# Patient Record
Sex: FEMALE | Race: BLACK OR AFRICAN AMERICAN | NOT HISPANIC OR LATINO | Employment: UNEMPLOYED | URBAN - NONMETROPOLITAN AREA
[De-identification: names, ages, dates, MRNs, and addresses within clinical notes are randomized per-mention and may not be internally consistent; named-entity substitution may affect disease eponyms.]

---

## 2020-05-12 ENCOUNTER — HISTORICAL (OUTPATIENT)
Dept: ADMINISTRATIVE | Facility: HOSPITAL | Age: 35
End: 2020-05-12

## 2020-05-12 LAB
ALBUMIN SERPL BCP-MCNC: 3.8 G/DL (ref 3.5–5)
ALBUMIN/GLOB SERPL ELPH: 1.1 {RATIO} (ref 1.5–2.2)
ALP SERPL-CCNC: 79 U/L (ref 45–117)
ALT SERPL W P-5'-P-CCNC: 21 U/L (ref 13–56)
ANION GAP SERPL CALC-SCNC: 7.8 MEQ/L (ref 10–20)
APPEARANCE, UA: CLEAR
AST SERPL-CCNC: 9 U/L (ref 15–37)
BACTERIA SPEC CULT: NEGATIVE /HPF
BASOPHILS NFR BLD: 0 10 (ref 0–0.1)
BASOPHILS NFR BLD: 0.7 % (ref 0–1.5)
BILIRUB SERPL-MCNC: 0.6 MG/DL (ref 0.2–1)
BILIRUB UR QL STRIP: NEGATIVE MG/DL
BUDDING YEAST: NORMAL /HPF
BUN SERPL-MCNC: 11 MG/DL (ref 7–18)
CALCIUM SERPL-MCNC: 8.9 MG/DL (ref 8.5–10.1)
CASTS, URINE MICROSCOPIC: NEGATIVE /LPF
CHLORIDE SERPL-SCNC: 107 MMOL/L (ref 98–107)
CO2 SERPL-SCNC: 28 MMOL/L (ref 22–32)
COLOR UR: YELLOW
CREAT SERPL-MCNC: 0.87 MG/DL (ref 0.55–1.02)
EGFR: 94 ML/MIN/1.73M
EOSINOPHIL NFR BLD: 0.3 10 (ref 0–0.7)
EOSINOPHIL NFR BLD: 4.4 % (ref 0–7)
EPITHELIAL, URINE MICROSCOPIC: NEGATIVE /HPF
ERYTHROCYTE [DISTWIDTH] IN BLOOD BY AUTOMATED COUNT: 11.9 % (ref 11.5–14.5)
GLOBULIN: 3.6 G/DL (ref 2.3–3.5)
GLUCOSE (UA): NEGATIVE MG/DL
GLUCOSE SERPL-MCNC: 105 MG/DL (ref 70–99)
GRAN #: 2.37 10 (ref 2–7.5)
GRAN%: 0.2 %
GRAN%: 40.3 % (ref 50–80)
HCT VFR BLD AUTO: 41.3 % (ref 37.7–47.9)
HGB BLD-MCNC: 13.9 G/DL (ref 12.2–16.2)
HGB UR QL STRIP: NORMAL ERY/UL
IMMATURE GRANULOCYTES #: 0.01 10
KETONES UR QL STRIP: NEGATIVE MG/DL
LACTATE SERPL-SCNC: 1.08 MMOL/L (ref 0.4–2)
LEUKOCYTE ESTERASE UR QL STRIP: NEGATIVE LEU/UL
LIPASE SERPL-CCNC: 77 U/L (ref 73–393)
LYMPH #: 2.7 10 (ref 1–3.5)
LYMPH%: 46.4 % (ref 12–50)
MCH RBC QN AUTO: 31 PG (ref 27–31)
MCHC RBC AUTO-ENTMCNC: 33.7 G% (ref 32–35)
MCV RBC AUTO: 92 FL (ref 80–97)
MONO #: 0.5 10 (ref 0–0.8)
MONO%: 8 % (ref 0–12)
NITRITE UR QL STRIP: NEGATIVE MG/DL
OSMOC: 277 MOSM/KG (ref 275–295)
PH UR STRIP: 6.5 [PH] (ref 5–7.5)
PMV BLD AUTO: 10.9 FL (ref 7.4–10.4)
PMV BLD AUTO: 277 10 (ref 142–424)
POTASSIUM SERPL-SCNC: 3.8 MMOL/L (ref 3.5–5.1)
PROT SERPL-MCNC: 7.4 G/DL (ref 6.4–8.2)
PROT UR QL STRIP: NEGATIVE MG/DL
RBC # BLD AUTO: 4.49 M/UL (ref 4.04–5.48)
RBC #/AREA URNS HPF: NEGATIVE /HPF
SODIUM BLD-SCNC: 139 MMOL/L (ref 136–145)
SP GR UR STRIP: 1.01 (ref 1–1.03)
SPERM, URINE MICROSCOPIC: NORMAL /HPF
TYPE OF SPECIMEN  (UA): NORMAL
UNCLASSIFIED CRYSTALS, UA: NORMAL /HPF
UROBILINOGEN UR STRIP-ACNC: 1 EU/L
WBC # BLD AUTO: 5.9 10 (ref 4–10.2)
WBC #/AREA URNS HPF: NEGATIVE /HPF

## 2021-01-15 ENCOUNTER — HOSPITAL ENCOUNTER (EMERGENCY)
Facility: HOSPITAL | Age: 36
Discharge: HOME | End: 2021-01-15
Attending: EMERGENCY MEDICINE
Payer: MEDICAID

## 2021-01-15 VITALS
TEMPERATURE: 98 F | OXYGEN SATURATION: 98 % | DIASTOLIC BLOOD PRESSURE: 91 MMHG | RESPIRATION RATE: 20 BRPM | SYSTOLIC BLOOD PRESSURE: 161 MMHG | HEART RATE: 80 BPM | WEIGHT: 238 LBS | BODY MASS INDEX: 40.63 KG/M2 | HEIGHT: 64 IN

## 2021-01-15 DIAGNOSIS — G89.29 OTHER CHRONIC PAIN: Primary | ICD-10-CM

## 2021-01-15 PROCEDURE — 99284 EMERGENCY DEPT VISIT MOD MDM: CPT | Mod: 25

## 2021-01-15 PROCEDURE — 25000003 PHARM REV CODE 250: Performed by: NURSE PRACTITIONER

## 2021-01-15 PROCEDURE — 63600175 PHARM REV CODE 636 W HCPCS: Performed by: NURSE PRACTITIONER

## 2021-01-15 PROCEDURE — 96372 THER/PROPH/DIAG INJ SC/IM: CPT

## 2021-01-15 RX ORDER — ORPHENADRINE CITRATE 30 MG/ML
60 INJECTION INTRAMUSCULAR; INTRAVENOUS
Status: COMPLETED | OUTPATIENT
Start: 2021-01-15 | End: 2021-01-15

## 2021-01-15 RX ORDER — HYDROCODONE BITARTRATE AND ACETAMINOPHEN 5; 325 MG/1; MG/1
1 TABLET ORAL
Status: COMPLETED | OUTPATIENT
Start: 2021-01-15 | End: 2021-01-15

## 2021-01-15 RX ORDER — METHOCARBAMOL 500 MG/1
1000 TABLET, FILM COATED ORAL 2 TIMES DAILY PRN
Qty: 20 TABLET | Refills: 0 | Status: SHIPPED | OUTPATIENT
Start: 2021-01-15 | End: 2021-01-20

## 2021-01-15 RX ADMIN — ORPHENADRINE CITRATE 60 MG: 60 INJECTION INTRAMUSCULAR; INTRAVENOUS at 04:01

## 2021-01-15 RX ADMIN — HYDROCODONE BITARTRATE AND ACETAMINOPHEN 1 TABLET: 5; 325 TABLET ORAL at 04:01

## 2021-05-06 ENCOUNTER — PATIENT MESSAGE (OUTPATIENT)
Dept: RESEARCH | Facility: HOSPITAL | Age: 36
End: 2021-05-06

## 2021-05-28 ENCOUNTER — LAB VISIT (OUTPATIENT)
Dept: LAB | Facility: HOSPITAL | Age: 36
End: 2021-05-28
Attending: NURSE PRACTITIONER
Payer: MEDICAID

## 2021-05-28 DIAGNOSIS — R10.84 ABDOMINAL PAIN, GENERALIZED: Primary | ICD-10-CM

## 2021-05-28 LAB — OB PNL STL: NEGATIVE

## 2021-05-28 PROCEDURE — 87209 SMEAR COMPLEX STAIN: CPT | Performed by: NURSE PRACTITIONER

## 2021-05-28 PROCEDURE — 82272 OCCULT BLD FECES 1-3 TESTS: CPT | Performed by: NURSE PRACTITIONER

## 2021-06-01 LAB — O+P STL MICRO: NORMAL

## 2021-10-18 PROBLEM — R93.5 ABNORMAL CT OF THE ABDOMEN: Status: ACTIVE | Noted: 2021-10-18

## 2022-06-17 ENCOUNTER — NURSE TRIAGE (OUTPATIENT)
Dept: ADMINISTRATIVE | Facility: CLINIC | Age: 37
End: 2022-06-17
Payer: MEDICAID

## 2022-06-17 ENCOUNTER — HOSPITAL ENCOUNTER (EMERGENCY)
Facility: HOSPITAL | Age: 37
Discharge: HOME | End: 2022-06-17
Attending: EMERGENCY MEDICINE
Payer: MEDICAID

## 2022-06-17 VITALS
WEIGHT: 222 LBS | TEMPERATURE: 98 F | RESPIRATION RATE: 16 BRPM | DIASTOLIC BLOOD PRESSURE: 74 MMHG | HEIGHT: 64 IN | HEART RATE: 87 BPM | OXYGEN SATURATION: 100 % | BODY MASS INDEX: 37.9 KG/M2 | SYSTOLIC BLOOD PRESSURE: 132 MMHG

## 2022-06-17 DIAGNOSIS — T63.461A TOXIC REACTION TO HORNETS, WASPS AND BEES, ACCIDENTAL OR UNINTENTIONAL, INITIAL ENCOUNTER: Primary | ICD-10-CM

## 2022-06-17 DIAGNOSIS — T63.441A TOXIC REACTION TO HORNETS, WASPS AND BEES, ACCIDENTAL OR UNINTENTIONAL, INITIAL ENCOUNTER: Primary | ICD-10-CM

## 2022-06-17 DIAGNOSIS — T63.451A TOXIC REACTION TO HORNETS, WASPS AND BEES, ACCIDENTAL OR UNINTENTIONAL, INITIAL ENCOUNTER: Primary | ICD-10-CM

## 2022-06-17 PROCEDURE — 63600175 PHARM REV CODE 636 W HCPCS: Performed by: EMERGENCY MEDICINE

## 2022-06-17 PROCEDURE — 96372 THER/PROPH/DIAG INJ SC/IM: CPT | Performed by: EMERGENCY MEDICINE

## 2022-06-17 PROCEDURE — 99284 EMERGENCY DEPT VISIT MOD MDM: CPT | Mod: 25

## 2022-06-17 RX ORDER — NAPROXEN 500 MG/1
500 TABLET ORAL EVERY 12 HOURS PRN
Qty: 20 TABLET | Refills: 0 | Status: SHIPPED | OUTPATIENT
Start: 2022-06-17 | End: 2023-01-09

## 2022-06-17 RX ORDER — KETOROLAC TROMETHAMINE 30 MG/ML
15 INJECTION, SOLUTION INTRAMUSCULAR; INTRAVENOUS
Status: COMPLETED | OUTPATIENT
Start: 2022-06-17 | End: 2022-06-17

## 2022-06-17 RX ADMIN — KETOROLAC TROMETHAMINE 15 MG: 30 INJECTION, SOLUTION INTRAMUSCULAR at 09:06

## 2022-06-18 NOTE — ED PROVIDER NOTES
EMERGENCY DEPARTMENT HISTORY AND PHYSICAL EXAM          Date: 6/17/2022   Patient Name: Selena Devine       History of Presenting Illness           Chief Complaint   Patient presents with    Insect Bite     Pt stated that she was stung by a wasp yesterday to left lower leg - presents to ED tonight with complaints of pain/reddening/swelling to lower extremity.          History Provided By: Patient    2108   Selena Devine is a 37 y.o. female with PMHX of denies significant history who presents to the emergency department C/O left leg pain.    Patient reports she was stung by a wasp to her left calf yesterday.  Reports increased pain, pruritus, and swelling today.     PCP: Primary Doctor No        No current facility-administered medications for this encounter.     Current Outpatient Medications   Medication Sig Dispense Refill    cholestyramine (QUESTRAN) 4 gram packet Take 1 packet (4 g total) by mouth once daily. 90 packet 3    diclofenac sodium (VOLTAREN) 1 % Gel APPLY 1 GRAM TOPICALLY FOUR TIMES DAILY      dicyclomine (BENTYL) 20 mg tablet       diphenhydrAMINE (BENADRYL) 2 % cream Apply topically 3 (three) times daily as needed for Itching. 30 g 0    famotidine (PEPCID) 40 MG tablet       gabapentin (NEURONTIN) 300 MG capsule Take 300 mg by mouth 2 (two) times daily.      hydrOXYzine pamoate (VISTARIL) 50 MG Cap       hyoscyamine (ANASPAZ,LEVSIN) 0.125 mg Tab Take 1 tablet (125 mcg total) by mouth every 4 (four) hours as needed (abdominal pain). 120 tablet 3    ibuprofen (ADVIL,MOTRIN) 400 MG tablet TAKE 1 TABLET BY MOUTH EVERY 8 HOURS FOR UP TO 15 DAYS AS NEEDED FOR PAIN      lansoprazole (PREVACID) 30 MG capsule       naproxen (NAPROSYN) 500 MG tablet Take 1 tablet (500 mg total) by mouth every 12 (twelve) hours as needed (Pain). 20 tablet 0    pantoprazole (PROTONIX) 40 MG tablet Take 40 mg by mouth once daily.      topiramate (TOPAMAX) 50 MG tablet              Past History  "    Past Medical History:   History reviewed. No pertinent past medical history.     Past Surgical History:   Past Surgical History:   Procedure Laterality Date    CHOLECYSTECTOMY      COLONOSCOPY N/A 10/18/2021    Procedure: COLONOSCOPY;  Surgeon: Jorge Yeung MD;  Location: [facility];  Service: Endoscopy;  Laterality: N/A;  needs rapid covid    TUBAL LIGATION          Family History:   No family history on file.     Social History:   Social History     Tobacco Use    Smoking status: Never Smoker    Smokeless tobacco: Never Used   Substance Use Topics    Alcohol use: Yes     Comment: occ    Drug use: Never        Allergies:   Review of patient's allergies indicates:  No Known Allergies       Review of Systems   Review of Systems   Skin: Positive for rash and wound.   All other systems reviewed and are negative.               Physical Exam     Vitals:    06/17/22 2051 06/17/22 2110   BP: 136/88 132/74   Pulse: 82 87   Resp: 16 16   Temp: 97.8 °F (36.6 °C) 97.9 °F (36.6 °C)   SpO2: 98% 100%   Weight: 100.7 kg (222 lb)    Height: 5' 4" (1.626 m)       Physical Exam  Vitals and nursing note reviewed.   Constitutional:       General: She is not in acute distress.     Appearance: Normal appearance. She is not ill-appearing.   HENT:      Head: Normocephalic and atraumatic.      Nose: Nose normal. No congestion or rhinorrhea.      Mouth/Throat:      Mouth: Mucous membranes are moist.   Eyes:      Extraocular Movements: Extraocular movements intact.      Pupils: Pupils are equal, round, and reactive to light.   Cardiovascular:      Rate and Rhythm: Regular rhythm.   Pulmonary:      Effort: Pulmonary effort is normal. No respiratory distress.   Musculoskeletal:         General: No tenderness or deformity. Normal range of motion.      Cervical back: Normal range of motion.      Right lower leg: Normal.      Left lower leg: Swelling present.      Comments: Induration erythema left calf with stinger puncture " wound   Skin:     General: Skin is warm and dry.   Neurological:      General: No focal deficit present.      Mental Status: She is alert and oriented to person, place, and time. Mental status is at baseline.   Psychiatric:         Mood and Affect: Mood normal.         Behavior: Behavior normal.              Diagnostic Study Results      Labs -   No results found for this or any previous visit (from the past 12 hour(s)).     Radiologic Studies -    No orders to display        Medications given in the ED-   Medications   ketorolac injection 15 mg (15 mg Intramuscular Given 6/17/22 2114)           Medical Decision Making    I am the first provider for this patient.     I reviewed the vital signs, available nursing notes, past medical history, past surgical history, family history and social history.     Vital Signs:  Reviewed the patient's vital signs.     Pulse Oximetry Analysis and Interpretation:    100% on Room Air, normal      Records Reviewed: Nursing notes.        Provider Notes (Medical Decision Making): Selena Devine is a 37 y.o. female here with wasp sting.  Discussed symptomatic management.  Reasons to return to ER discussed.        Procedures:   Procedures      ED Course:               Diagnosis and Disposition     Critical Care:      DISCHARGE NOTE:       Selena Devine's  results have been reviewed with her.  She has been counseled regarding her diagnosis, treatment, and plan.  She verbally conveys understanding and agreement of the signs, symptoms, diagnosis, treatment and prognosis and additionally agrees to follow up as discussed.  She also agrees with the care-plan and conveys that all of her questions have been answered.  I have also provided discharge instructions for her that include: educational information regarding their diagnosis and treatment, and list of reasons why they would want to return to the ED prior to their follow-up appointment, should her condition change. She has  been provided with education for proper emergency department utilization.         CLINICAL IMPRESSION:         1. Toxic reaction to hornets, wasps and bees, accidental or unintentional, initial encounter              PLAN:   1. Discharge Home  2.      Medication List      START taking these medications    diphenhydrAMINE 2 % cream  Commonly known as: BENADRYL  Apply topically 3 (three) times daily as needed for Itching.     naproxen 500 MG tablet  Commonly known as: NAPROSYN  Take 1 tablet (500 mg total) by mouth every 12 (twelve) hours as needed (Pain).        ASK your doctor about these medications    cholestyramine 4 gram packet  Commonly known as: QUESTRAN  Take 1 packet (4 g total) by mouth once daily.     diclofenac sodium 1 % Gel  Commonly known as: VOLTAREN     dicyclomine 20 mg tablet  Commonly known as: BENTYL     famotidine 40 MG tablet  Commonly known as: PEPCID     gabapentin 300 MG capsule  Commonly known as: NEURONTIN     hydrOXYzine pamoate 50 MG Cap  Commonly known as: VISTARIL     hyoscyamine 0.125 mg Tab  Commonly known as: ANASPAZ,LEVSIN  Take 1 tablet (125 mcg total) by mouth every 4 (four) hours as needed (abdominal pain).     ibuprofen 400 MG tablet  Commonly known as: ADVIL,MOTRIN     lansoprazole 30 MG capsule  Commonly known as: PREVACID     pantoprazole 40 MG tablet  Commonly known as: PROTONIX     topiramate 50 MG tablet  Commonly known as: TOPAMAX           Where to Get Your Medications      These medications were sent to [facility][id number] - [address] AT [address] [address]    Phone: [phone]   · diphenhydrAMINE 2 % cream  · naproxen 500 MG tablet        3. [facility] - Emergency Department  [address] [place] [id number] [phone]  Go to   If symptoms worsen       _______________________________     Please note that this dictation was completed with M*Modal,  the computer voice recognition software.  Quite often unanticipated grammatical, syntax, homophones, and other interpretive errors are inadvertently transcribed by the computer software.  Please disregard these errors.  Please excuse any errors that have escaped final proofreading.             Parker Hearn MD  06/17/22 4409

## 2022-06-18 NOTE — TELEPHONE ENCOUNTER
Pt called for c/o left leg swelling she was bite by wasp last night behind the knee and now she has swelling and its increasing pt said that she is also having tingling to her feet and toes no discoloration pt has been putting compresses but it doesn't seem to be helping. Pt triaged and care advice to go to the ED now   Reason for Disposition   [1] Can't walk or can barely walk AND [2] new-onset    Additional Information   Negative: SEVERE difficulty breathing (e.g., struggling for each breath, speaks in single words)   Negative: Looks like a broken bone or dislocated joint (e.g., crooked or deformed)   Negative: Sounds like a life-threatening emergency to the triager   Negative: Difficulty breathing at rest   Negative: Entire foot is cool or blue in comparison to other side    Protocols used: LEG SWELLING AND EDEMA-A-AH

## 2022-10-11 ENCOUNTER — HOSPITAL ENCOUNTER (EMERGENCY)
Facility: HOSPITAL | Age: 37
Discharge: HOME | End: 2022-10-11
Attending: STUDENT IN AN ORGANIZED HEALTH CARE EDUCATION/TRAINING PROGRAM
Payer: MEDICAID

## 2022-10-11 VITALS
DIASTOLIC BLOOD PRESSURE: 73 MMHG | HEIGHT: 64 IN | HEART RATE: 79 BPM | SYSTOLIC BLOOD PRESSURE: 137 MMHG | WEIGHT: 212 LBS | OXYGEN SATURATION: 97 % | BODY MASS INDEX: 36.19 KG/M2 | RESPIRATION RATE: 18 BRPM | TEMPERATURE: 99 F

## 2022-10-11 DIAGNOSIS — R07.9 CHEST PAIN: Primary | ICD-10-CM

## 2022-10-11 DIAGNOSIS — M79.605 LEFT LEG PAIN: ICD-10-CM

## 2022-10-11 DIAGNOSIS — R05.9 COUGH: ICD-10-CM

## 2022-10-11 PROCEDURE — 93010 EKG 12-LEAD: ICD-10-PCS | Mod: ,,, | Performed by: INTERNAL MEDICINE

## 2022-10-11 PROCEDURE — 93005 ELECTROCARDIOGRAM TRACING: CPT

## 2022-10-11 PROCEDURE — 93010 ELECTROCARDIOGRAM REPORT: CPT | Mod: ,,, | Performed by: INTERNAL MEDICINE

## 2022-10-11 PROCEDURE — 99284 EMERGENCY DEPT VISIT MOD MDM: CPT | Mod: 25

## 2022-10-11 RX ORDER — PREDNISONE 50 MG/1
50 TABLET ORAL DAILY
Qty: 5 TABLET | Refills: 0 | Status: SHIPPED | OUTPATIENT
Start: 2022-10-11 | End: 2023-01-09

## 2022-10-11 RX ORDER — CYCLOBENZAPRINE HCL 10 MG
10 TABLET ORAL 3 TIMES DAILY PRN
Qty: 15 TABLET | Refills: 0 | Status: SHIPPED | OUTPATIENT
Start: 2022-10-11 | End: 2022-10-16

## 2022-10-11 NOTE — Clinical Note
"Selena"Selena" Minor was seen and treated in our emergency department on 10/11/2022.  She may return to work on 10/13/2022.       If you have any questions or concerns, please don't hesitate to call.      Cricket Rios MD"

## 2022-10-12 ENCOUNTER — PATIENT OUTREACH (OUTPATIENT)
Dept: EMERGENCY MEDICINE | Facility: HOSPITAL | Age: 37
End: 2022-10-12
Payer: MEDICAID

## 2022-10-12 NOTE — PROGRESS NOTES
Patient declined ED navigation assessment and denied any needs/resources at this time.  Patient stated she made an appt.    Rohini Osorio  ED Navigator- [place]

## 2023-02-23 ENCOUNTER — HOSPITAL ENCOUNTER (EMERGENCY)
Facility: HOSPITAL | Age: 38
Discharge: HOME | End: 2023-02-23
Attending: EMERGENCY MEDICINE
Payer: MEDICAID

## 2023-02-23 VITALS
TEMPERATURE: 98 F | BODY MASS INDEX: 35.85 KG/M2 | OXYGEN SATURATION: 97 % | RESPIRATION RATE: 18 BRPM | HEIGHT: 64 IN | WEIGHT: 210 LBS | SYSTOLIC BLOOD PRESSURE: 149 MMHG | DIASTOLIC BLOOD PRESSURE: 106 MMHG | HEART RATE: 72 BPM

## 2023-02-23 DIAGNOSIS — J10.1 INFLUENZA B: Primary | ICD-10-CM

## 2023-02-23 LAB
CTP QC/QA: YES
CTP QC/QA: YES
GROUP A STREP, MOLECULAR: NEGATIVE
POC MOLECULAR INFLUENZA A AGN: NEGATIVE
POC MOLECULAR INFLUENZA B AGN: POSITIVE
SARS-COV-2 RDRP RESP QL NAA+PROBE: NEGATIVE

## 2023-02-23 PROCEDURE — 87651 STREP A DNA AMP PROBE: CPT | Performed by: CLINICAL NURSE SPECIALIST

## 2023-02-23 PROCEDURE — 87502 INFLUENZA DNA AMP PROBE: CPT

## 2023-02-23 PROCEDURE — 99284 EMERGENCY DEPT VISIT MOD MDM: CPT

## 2023-02-23 RX ORDER — PROMETHAZINE HYDROCHLORIDE AND DEXTROMETHORPHAN HYDROBROMIDE 6.25; 15 MG/5ML; MG/5ML
5 SYRUP ORAL EVERY 4 HOURS PRN
Qty: 120 ML | Refills: 0 | Status: SHIPPED | OUTPATIENT
Start: 2023-02-23 | End: 2023-03-05

## 2023-02-23 RX ORDER — FLUTICASONE PROPIONATE 50 MCG
1 SPRAY, SUSPENSION (ML) NASAL 2 TIMES DAILY PRN
Qty: 15 G | Refills: 0 | Status: SHIPPED | OUTPATIENT
Start: 2023-02-23 | End: 2023-09-05 | Stop reason: SDUPTHER

## 2023-02-23 RX ORDER — OSELTAMIVIR PHOSPHATE 75 MG/1
75 CAPSULE ORAL 2 TIMES DAILY
Qty: 10 CAPSULE | Refills: 0 | Status: SHIPPED | OUTPATIENT
Start: 2023-02-23 | End: 2023-02-28

## 2023-02-23 NOTE — Clinical Note
"Selena "Selena" Minor was seen and treated in our emergency department on 2/23/2023.  She may return to work on 02/27/2023.       If you have any questions or concerns, please don't hesitate to call.      Destini Bowman MD"

## 2023-02-24 NOTE — ED PROVIDER NOTES
Encounter Date: 2/23/2023       History     Chief Complaint   Patient presents with    Cough     Cough, congestion, subjective fever, sore throat x 1 week.      38 year-old female presents the emergency room cough, congestion,   Sore throat, subjective fever for the last week.  History of hypertension GERD    Review of patient's allergies indicates:  No Known Allergies  Past Medical History:   Diagnosis Date    Essential (primary) hypertension     GERD (gastroesophageal reflux disease)      Past Surgical History:   Procedure Laterality Date    CHOLECYSTECTOMY      COLONOSCOPY N/A 10/18/2021    Procedure: COLONOSCOPY;  Surgeon: Jorge Yeung MD;  [facility];  Service: Endoscopy;  Laterality: N/A;  needs rapid covid    TUBAL LIGATION       Family History   Problem Relation Age of Onset    Hypertension Mother     Hypertension Father      Social History     Tobacco Use    Smoking status: Never    Smokeless tobacco: Never   Substance Use Topics    Alcohol use: Yes     Comment: occ    Drug use: Never     Review of Systems   Constitutional:  Positive for fever.   HENT:  Positive for congestion and sore throat.    Respiratory:  Positive for cough. Negative for shortness of breath.    Cardiovascular:  Negative for chest pain.   Gastrointestinal:  Negative for nausea.   Genitourinary:  Negative for dysuria.   Musculoskeletal:  Negative for back pain.   Skin:  Negative for rash.   Neurological:  Negative for weakness.   Hematological:  Does not bruise/bleed easily.   All other systems reviewed and are negative.    Physical Exam     Initial Vitals [02/23/23 1939]   BP Pulse Resp Temp SpO2   (!) 149/106 72 18 98.1 °F (36.7 °C) 97 %      MAP       --         Physical Exam    Nursing note and vitals reviewed.  Constitutional: She appears well-developed and well-nourished.   HENT:   Head: Normocephalic and atraumatic.   Eyes: Pupils are equal, round, and reactive to light.   Neck:   Normal range of  motion.  Cardiovascular:  Normal rate and regular rhythm.           Pulmonary/Chest: Breath sounds normal.   Abdominal: Abdomen is soft. Bowel sounds are normal.   Musculoskeletal:         General: Normal range of motion.      Cervical back: Normal range of motion.     Neurological: She is alert and oriented to person, place, and time.   Skin: Skin is warm and dry.   Psychiatric: She has a normal mood and affect.       ED Course   Procedures  Labs Reviewed   POCT INFLUENZA A/B MOLECULAR - Abnormal; Notable for the following components:       Result Value    POC Molecular Influenza B Ag Positive (*)     All other components within normal limits   GROUP A STREP, MOLECULAR   SARS-COV-2 RDRP GENE    Narrative:     This test utilizes isothermal nucleic acid amplification technology to detect the SARS-CoV-2 RdRp nucleic acid segment. The analytical sensitivity (limit of detection) is 500 copies/swab.     A POSITIVE result is indicative of the presence of SARS-CoV-2 RNA; clinical correlation with patient history and other diagnostic information is necessary to determine patient infection status.    A NEGATIVE result means that SARS-CoV-2 nucleic acids are not present above the limit of detection. A NEGATIVE result should be treated as presumptive. It does not rule out the possibility of COVID-19 and should not be the sole basis for treatment decisions. If COVID-19 is strongly suspected based on clinical and exposure history, re-testing using an alternate molecular assay should be considered.     This test is only for use under the Food and Drug Administration s Emergency Use Authorization (EUA).     Commercial kits are provided by [employer]. Performance characteristics of the EUA have been independently verified by [facility] Department of Pathology and Laboratory Medicine.   _________________________________________________________________   The authorized Fact Sheet for Healthcare Providers and the  authorized Fact Sheet for Patients of the ID NOW COVID-19 are available on the FDA website:    https://www.fda.gov/media/200001/download      https://www.fda.gov/media/161989/download              Imaging Results    None          Medications - No data to display  Medical Decision Making:   Differential Diagnosis:    COVID, flu,  sinusitis, pharyngitis, strep throat  Clinical Tests:   Lab Tests: Ordered and Reviewed                        Clinical Impression:   Final diagnoses:  [J10.1] Influenza B (Primary)        ED Disposition Condition    Discharge Stable          ED Prescriptions       Medication Sig Dispense Start Date End Date Auth. Provider    fluticasone propionate (FLONASE) 50 mcg/actuation nasal spray 1 spray (50 mcg total) by Each Nostril route 2 (two) times daily as needed. 15 g 2/23/2023 -- Rosanna Martell NP    oseltamivir (TAMIFLU) 75 MG capsule Take 1 capsule (75 mg total) by mouth 2 (two) times daily. for 5 days 10 capsule 2/23/2023 2/28/2023 Rosanna Martell NP    promethazine-dextromethorphan (PROMETHAZINE-DM) 6.25-15 mg/5 mL Syrp Take 5 mLs by mouth every 4 (four) hours as needed. 120 mL 2/23/2023 3/5/2023 Rosanna Martell NP          Follow-up Information       Follow up With Specialties Details Why Contact Info    [facility]  As needed [address] [facility]  [place] [address]  [phone]               Rosanna Martell NP  02/23/23 212

## 2023-06-21 ENCOUNTER — HOSPITAL ENCOUNTER (EMERGENCY)
Facility: HOSPITAL | Age: 38
Discharge: HOME | End: 2023-06-21
Attending: EMERGENCY MEDICINE
Payer: MEDICAID

## 2023-06-21 VITALS
WEIGHT: 199.63 LBS | HEIGHT: 64 IN | DIASTOLIC BLOOD PRESSURE: 80 MMHG | RESPIRATION RATE: 18 BRPM | BODY MASS INDEX: 34.08 KG/M2 | SYSTOLIC BLOOD PRESSURE: 147 MMHG | TEMPERATURE: 98 F | OXYGEN SATURATION: 100 % | HEART RATE: 84 BPM

## 2023-06-21 DIAGNOSIS — J02.9 VIRAL PHARYNGITIS: Primary | ICD-10-CM

## 2023-06-21 LAB
CTP QC/QA: YES
GROUP A STREP, MOLECULAR: NEGATIVE
SARS-COV-2 RDRP RESP QL NAA+PROBE: NEGATIVE

## 2023-06-21 PROCEDURE — 99283 EMERGENCY DEPT VISIT LOW MDM: CPT

## 2023-06-21 PROCEDURE — 87635 SARS-COV-2 COVID-19 AMP PRB: CPT

## 2023-06-21 PROCEDURE — 87651 STREP A DNA AMP PROBE: CPT

## 2023-06-21 RX ORDER — METHYLPREDNISOLONE 4 MG/1
TABLET ORAL
Qty: 21 EACH | Refills: 0 | Status: SHIPPED | OUTPATIENT
Start: 2023-06-21 | End: 2023-07-10

## 2023-06-21 NOTE — ED PROVIDER NOTES
Encounter Date: 6/21/2023       History     Chief Complaint   Patient presents with    Sore Throat     Sore throat, fever, body aches x 4 days. Also reports diarrhea.      This note is dictated on M*Modal word recognition program.  There are word recognition mistakes and grammatical errors that are occasionally missed on review.     Selena Devine is a 38 y.o. female presents to ER today with complaints of sore throat, fatigue, and diarrhea for the last 4-5 days.  Patient reports she felt like she had strep throat.  Patient also reports she felt like she ran a fever last night because she was very hot had chills.    The history is provided by the patient.   Review of patient's allergies indicates:  No Known Allergies  Past Medical History:   Diagnosis Date    Essential (primary) hypertension     GERD (gastroesophageal reflux disease)      Past Surgical History:   Procedure Laterality Date    CHOLECYSTECTOMY      COLONOSCOPY N/A 10/18/2021    Procedure: COLONOSCOPY;  Surgeon: Jorge Yeung MD;  [facility];  Service: Endoscopy;  Laterality: N/A;  needs rapid covid    TUBAL LIGATION       Family History   Problem Relation Age of Onset    Hypertension Mother     Hypertension Father      Social History     Tobacco Use    Smoking status: Never    Smokeless tobacco: Never   Substance Use Topics    Alcohol use: Yes     Comment: occ    Drug use: Never     Review of Systems   Constitutional:  Positive for fatigue and fever.   HENT:  Positive for sore throat.    Gastrointestinal:  Positive for diarrhea.     Physical Exam     Initial Vitals [06/21/23 1626]   BP Pulse Resp Temp SpO2   (!) 147/80 84 18 98 °F (36.7 °C) 100 %      MAP       --         Physical Exam    Constitutional: She appears well-developed and well-nourished. She is not diaphoretic. No distress.   HENT:   Head: Normocephalic.   Oropharynx is erythemic with no hypertrophied tonsils and no exudate on tonsils   Eyes: Pupils are equal,  round, and reactive to light. Right eye exhibits no discharge. Left eye exhibits no discharge.   Neck: Neck supple.   Normal range of motion.  Cardiovascular:  Normal rate.           Abdominal: Abdomen is soft.   Musculoskeletal:         General: No tenderness or edema.      Cervical back: Normal range of motion and neck supple.     Neurological: She is alert and oriented to person, place, and time. GCS score is 15. GCS eye subscore is 4. GCS verbal subscore is 5. GCS motor subscore is 6.   Skin: Capillary refill takes less than 2 seconds. No erythema.   Psychiatric: She has a normal mood and affect. Her behavior is normal. Judgment and thought content normal.       ED Course   Procedures  Labs Reviewed   GROUP A STREP, MOLECULAR   SARS-COV-2 RDRP GENE    Narrative:     This test utilizes isothermal nucleic acid amplification technology to detect the SARS-CoV-2 RdRp nucleic acid segment. The analytical sensitivity (limit of detection) is 500 copies/swab.     A POSITIVE result is indicative of the presence of SARS-CoV-2 RNA; clinical correlation with patient history and other diagnostic information is necessary to determine patient infection status.    A NEGATIVE result means that SARS-CoV-2 nucleic acids are not present above the limit of detection. A NEGATIVE result should be treated as presumptive. It does not rule out the possibility of COVID-19 and should not be the sole basis for treatment decisions. If COVID-19 is strongly suspected based on clinical and exposure history, re-testing using an alternate molecular assay should be considered.     This test is only for use under the Food and Drug Administration s Emergency Use Authorization (EUA).     Commercial kits are provided by [employer]. Performance characteristics of the EUA have been independently verified by [facility] Department of Pathology and Laboratory Medicine.   _________________________________________________________________    The authorized Fact Sheet for Healthcare Providers and the authorized Fact Sheet for Patients of the ID NOW COVID-19 are available on the FDA website:    https://www.fda.gov/media/425618/download      https://www.fda.gov/media/984727/download             Imaging Results    None          Medications - No data to display  Medical Decision Making:   Differential Diagnosis:   Strep throat, viral pharyngitis, mono mother viral illness, COVID-19  Clinical Tests:   Lab Tests: Ordered and Reviewed  ED Management:  Patient tested negative for COVID-19 and strep throat today in ER   Patient's symptoms are consistent with viral pharyngitis.    Will treat patient with Solu-Medrol for inflamed throat   Patient instructed to take Tylenol and Motrin help control sore throat and any fevers   Patient instructed to follow package directions on Tylenol and Motrin proper dosing   Patient stable at time of discharge in no acute distress.  No life-threatening illnesses were found during ER visit today.  Patient was instructed to follow-up with PCP or other recommended specialist within the next 48-72 hours.  Patient was instructed to return to ER immediately for any worsening or concerning symptoms.  All discharge instructions discussed with patient, and patient agrees to comply with discharge instructions given today.                         Clinical Impression:   Final diagnoses:  [J02.9] Viral pharyngitis (Primary)        ED Disposition Condition    Discharge Stable          ED Prescriptions       Medication Sig Dispense Start Date End Date Auth. Provider    methylPREDNISolone (MEDROL DOSEPACK) 4 mg tablet use as directed 21 each 6/21/2023 -- Prosper Blancas NP          Follow-up Information    None          Prospre Blancas NP  06/21/23 5085

## 2023-06-21 NOTE — Clinical Note
"Selena "Selena" Minor was seen and treated in our emergency department on 6/21/2023.  She may return to work on 06/23/2023.       If you have any questions or concerns, please don't hesitate to call.      Prosper Blancas NP"

## 2023-09-05 ENCOUNTER — HOSPITAL ENCOUNTER (EMERGENCY)
Facility: HOSPITAL | Age: 38
Discharge: HOME | End: 2023-09-05
Attending: EMERGENCY MEDICINE
Payer: MEDICAID

## 2023-09-05 VITALS
WEIGHT: 193 LBS | HEIGHT: 64 IN | DIASTOLIC BLOOD PRESSURE: 65 MMHG | HEART RATE: 88 BPM | RESPIRATION RATE: 18 BRPM | TEMPERATURE: 98 F | OXYGEN SATURATION: 100 % | BODY MASS INDEX: 32.95 KG/M2 | SYSTOLIC BLOOD PRESSURE: 133 MMHG

## 2023-09-05 DIAGNOSIS — J06.9 VIRAL URI: Primary | ICD-10-CM

## 2023-09-05 LAB
CTP QC/QA: YES
SARS-COV-2 RDRP RESP QL NAA+PROBE: NEGATIVE

## 2023-09-05 PROCEDURE — 99283 EMERGENCY DEPT VISIT LOW MDM: CPT

## 2023-09-05 PROCEDURE — 87635 SARS-COV-2 COVID-19 AMP PRB: CPT

## 2023-09-05 RX ORDER — FLUTICASONE PROPIONATE 50 MCG
1 SPRAY, SUSPENSION (ML) NASAL 2 TIMES DAILY PRN
Qty: 15 G | Refills: 0 | Status: SHIPPED | OUTPATIENT
Start: 2023-09-05

## 2023-09-05 RX ORDER — CETIRIZINE HYDROCHLORIDE 10 MG/1
10 TABLET ORAL DAILY
Qty: 30 TABLET | Refills: 0 | Status: SHIPPED | OUTPATIENT
Start: 2023-09-05 | End: 2024-09-04

## 2023-09-05 RX ORDER — FLUTICASONE PROPIONATE 50 MCG
SPRAY, SUSPENSION (ML) NASAL
Qty: 48 G | OUTPATIENT
Start: 2023-09-05

## 2023-09-05 NOTE — ED PROVIDER NOTES
"Encounter Date: 9/5/2023       History     Chief Complaint   Patient presents with    Nasal Congestion     Pt reports having sore throat, congestion, runny nose x1 week. States that son was dx with strep today and thinks she may have it as well.      HPI  Review of patient's allergies indicates:  No Known Allergies  Past Medical History:   Diagnosis Date    Essential (primary) hypertension     GERD (gastroesophageal reflux disease)     Insomnia      Past Surgical History:   Procedure Laterality Date    CHOLECYSTECTOMY      COLONOSCOPY N/A 10/18/2021    Procedure: COLONOSCOPY;  Surgeon: Jorge Yeung MD;  [facility]: [facility];  Service: Endoscopy;  Laterality: N/A;  needs rapid covid    TUBAL LIGATION       Family History   Problem Relation Age of Onset    Hypertension Mother     Hypertension Father      Social History     Tobacco Use    Smoking status: Never    Smokeless tobacco: Never   Substance Use Topics    Alcohol use: Yes     Comment: occ    Drug use: Never     Review of Systems    Physical Exam     Initial Vitals [09/05/23 1316]   BP Pulse Resp Temp SpO2   133/65 88 18 97.9 °F (36.6 °C) 100 %      MAP       --         Physical Exam    ED Course   Procedures  Labs Reviewed - No data to display       Imaging Results    None          Medications - No data to display  Medical Decision Making                             Clinical Impression:    ***Please document a Clinical Impression and click the "Refresh" button to refresh your note and automatically pull in before signing.***         "

## 2023-09-05 NOTE — Clinical Note
"Selena"Selena" Minor was seen and treated in our emergency department on 9/5/2023.  She may return to work on 09/07/2023.       If you have any questions or concerns, please don't hesitate to call.      Devan Bui, NP"

## 2023-09-05 NOTE — ED PROVIDER NOTES
Encounter Date: 9/5/2023       History     Chief Complaint   Patient presents with    Nasal Congestion     Pt reports having sore throat, congestion, runny nose x1 week. States that son was dx with strep today and thinks she may have it as well.      38-year-old female with history of hypertension and GERD presents to ED with complaints of sore throat, congestion, runny nose x1 week.  No improvement OTC meds.  Does report that her son tested positive for strep.  Denies any fever.  She does also have associated cough.    The history is provided by the patient.     Review of patient's allergies indicates:  No Known Allergies  Past Medical History:   Diagnosis Date    Essential (primary) hypertension     GERD (gastroesophageal reflux disease)     Insomnia      Past Surgical History:   Procedure Laterality Date    CHOLECYSTECTOMY      COLONOSCOPY N/A 10/18/2021    Procedure: COLONOSCOPY;  Surgeon: Jorge Yeung MD;  Location: [facility];  Service: Endoscopy;  Laterality: N/A;  needs rapid covid    TUBAL LIGATION       Family History   Problem Relation Age of Onset    Hypertension Mother     Hypertension Father      Social History     Tobacco Use    Smoking status: Never    Smokeless tobacco: Never   Substance Use Topics    Alcohol use: Yes     Comment: occ    Drug use: Never     Review of Systems   Constitutional:  Negative for fever.   HENT:  Positive for congestion, rhinorrhea and sore throat. Negative for trouble swallowing.    Eyes: Negative.    Respiratory:  Negative for shortness of breath.    Cardiovascular:  Negative for chest pain.   Gastrointestinal:  Negative for nausea.   Endocrine: Negative.    Genitourinary:  Negative for dysuria.   Musculoskeletal:  Negative for back pain.   Skin:  Negative for rash.   Allergic/Immunologic: Negative.    Neurological:  Negative for weakness.   Hematological:  Does not bruise/bleed easily.   Psychiatric/Behavioral: Negative.         Physical Exam     Initial Vitals  [09/05/23 1316]   BP Pulse Resp Temp SpO2   133/65 88 18 97.9 °F (36.6 °C) 100 %      MAP       --         Physical Exam    Nursing note and vitals reviewed.  Constitutional: She appears well-developed and well-nourished.   HENT:   Head: Normocephalic and atraumatic.   Nose: Mucosal edema and rhinorrhea present.   Mouth/Throat: Uvula is midline and mucous membranes are normal. Posterior oropharyngeal erythema present. No oropharyngeal exudate, posterior oropharyngeal edema or tonsillar abscesses.   Eyes: EOM are normal.   Neck: Neck supple.   Normal range of motion.  Cardiovascular:  Normal rate and regular rhythm.           Pulmonary/Chest: Breath sounds normal. No respiratory distress. She has no wheezes.   Abdominal: She exhibits no distension.   Musculoskeletal:         General: Normal range of motion.      Cervical back: Normal range of motion and neck supple.     Neurological: She is alert and oriented to person, place, and time.   Skin: Skin is warm and dry.   Psychiatric: Thought content normal.         ED Course   Procedures  Labs Reviewed   SARS-COV-2 RDRP GENE          Imaging Results    None          Medications - No data to display  Medical Decision Making  38-year-old female to ED for above complaints.  No signs of respiratory distress noted.  Lungs are clear in all fields.  Uvula was midline.  There was no exudate or edema noted to oropharynx.  Mild erythema noted.  She did have moderately swollen nasal turbinates with rhinorrhea noted.  COVID was negative.  Will treat with Zyrtec and Flonase.  Patient was instructed to take Tylenol and ibuprofen as needed for pain or fever.  She was instructed to hydrate well.  Strict return precautions were given.  Patient was stable for discharge.  She was to follow up with primary care.    Amount and/or Complexity of Data Reviewed  Labs: ordered.                               Clinical Impression:   Final diagnoses:  [J06.9] Viral URI (Primary)        ED  Disposition Condition    Discharge Stable          ED Prescriptions       Medication Sig Dispense Start Date End Date Auth. Provider    fluticasone propionate (FLONASE) 50 mcg/actuation nasal spray 1 spray (50 mcg total) by Each Nostril route 2 (two) times daily as needed for Rhinitis or Allergies. 15 g 9/5/2023 -- Devan Bui NP    cetirizine (ZYRTEC) 10 MG tablet Take 1 tablet (10 mg total) by mouth once daily. 30 tablet 9/5/2023 9/4/2024 Devan Bui NP          Follow-up Information       Follow up With Specialties Details Why Contact Info    Clinic, [facility] In 2 days  [address] [facility]  [place] [address]  [phone]               Devan Bui NP  09/05/23 1993

## 2023-09-05 NOTE — DISCHARGE INSTRUCTIONS
Your COVID was negative.  You have a viral infection that does not require antibiotics.  You can take the Zyrtec and Flonase daily for cough and congestion.  Drink plenty of fluids.  Take Tylenol and ibuprofen as needed for pain or fever.  Try using Vicks cough drops for your sore throat.  Follow up with primary care if symptoms do not improve.

## 2023-09-29 ENCOUNTER — HOSPITAL ENCOUNTER (EMERGENCY)
Facility: HOSPITAL | Age: 38
Discharge: HOME | End: 2023-09-29
Attending: EMERGENCY MEDICINE
Payer: MEDICAID

## 2023-09-29 VITALS
RESPIRATION RATE: 18 BRPM | HEART RATE: 84 BPM | OXYGEN SATURATION: 99 % | SYSTOLIC BLOOD PRESSURE: 158 MMHG | DIASTOLIC BLOOD PRESSURE: 79 MMHG | BODY MASS INDEX: 33.46 KG/M2 | HEIGHT: 64 IN | TEMPERATURE: 98 F | WEIGHT: 196 LBS

## 2023-09-29 DIAGNOSIS — M25.551 PAIN OF RIGHT HIP: Primary | ICD-10-CM

## 2023-09-29 PROCEDURE — 99284 EMERGENCY DEPT VISIT MOD MDM: CPT

## 2023-09-29 RX ORDER — KETOROLAC TROMETHAMINE 10 MG/1
10 TABLET, FILM COATED ORAL EVERY 6 HOURS
Qty: 20 TABLET | Refills: 0 | Status: SHIPPED | OUTPATIENT
Start: 2023-09-29 | End: 2023-10-04

## 2023-09-29 RX ORDER — CYCLOBENZAPRINE HCL 10 MG
10 TABLET ORAL 3 TIMES DAILY PRN
Qty: 15 TABLET | Refills: 0 | Status: SHIPPED | OUTPATIENT
Start: 2023-09-29 | End: 2023-10-04

## 2023-09-29 NOTE — ED PROVIDER NOTES
Encounter Date: 9/29/2023       History     Chief Complaint   Patient presents with    Hip Pain     Right hip and lower back pain x 4 days. States she was repositioning patient at onset of symptoms.      38-year-old female presents emergency room with right hip and lower back pain for the last 4 days.  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  Reports pain is worse with movement        Review of patient's allergies indicates:  No Known Allergies  Past Medical History:   Diagnosis Date    Essential (primary) hypertension     GERD (gastroesophageal reflux disease)     Insomnia      Past Surgical History:   Procedure Laterality Date    CHOLECYSTECTOMY      COLONOSCOPY N/A 10/18/2021    Procedure: COLONOSCOPY;  Surgeon: Jorge Yeung MD;  Location: [facility];  Service: Endoscopy;  Laterality: N/A;  needs rapid covid    TUBAL LIGATION       Family History   Problem Relation Age of Onset    Hypertension Mother     Hypertension Father      Social History     Tobacco Use    Smoking status: Never    Smokeless tobacco: Never   Substance Use Topics    Alcohol use: Yes     Comment: occ    Drug use: Never     Review of Systems   Constitutional:  Negative for fever.   HENT:  Negative for sore throat.    Respiratory:  Negative for shortness of breath.    Cardiovascular:  Negative for chest pain.   Gastrointestinal:  Negative for nausea.   Genitourinary:  Negative for dysuria.   Musculoskeletal:  Positive for arthralgias and myalgias. Negative for back pain.   Skin:  Negative for rash.   Neurological:  Negative for weakness.   Hematological:  Does not bruise/bleed easily.   All other systems reviewed and are negative.      Physical Exam     Initial Vitals [09/29/23 1212]   BP Pulse Resp Temp SpO2   (!) 158/79 84 18 98.4 °F (36.9 °C) 99 %      MAP       --         Physical Exam    Nursing note and vitals reviewed.  Constitutional: She appears well-developed and well-nourished.   HENT:    Head: Normocephalic and atraumatic.   Eyes: Pupils are equal, round, and reactive to light.   Neck:   Normal range of motion.  Cardiovascular:  Normal rate and regular rhythm.           Pulmonary/Chest: Breath sounds normal.   Abdominal: Abdomen is soft. Bowel sounds are normal.   Musculoskeletal:         General: Normal range of motion.      Cervical back: Normal range of motion.     Neurological: She is alert and oriented to person, place, and time.   Skin: Skin is warm and dry.   Psychiatric: She has a normal mood and affect.         ED Course   Procedures  Labs Reviewed - No data to display       Imaging Results    None          Medications - No data to display  Medical Decision Making  Risk  Prescription drug management.                               Clinical Impression:   Final diagnoses:  [M25.551] Pain of right hip (Primary)        ED Disposition Condition    Discharge Stable          ED Prescriptions       Medication Sig Dispense Start Date End Date Auth. Provider    cyclobenzaprine (FLEXERIL) 10 MG tablet Take 1 tablet (10 mg total) by mouth 3 (three) times daily as needed for Muscle spasms. 15 tablet 9/29/2023 10/4/2023 Rosanna Martell NP    ketorolac (TORADOL) 10 mg tablet Take 1 tablet (10 mg total) by mouth every 6 (six) hours. for 5 days 20 tablet 9/29/2023 10/4/2023 Rosanna Martell NP          Follow-up Information       Follow up With Specialties Details Why Contact [facility]    Clinic, [facility]  As needed [address]  [place] [address]  [phone]               Rosanna Martell NP  09/29/23 2709

## 2024-12-30 ENCOUNTER — LAB VISIT (OUTPATIENT)
Dept: LAB | Facility: HOSPITAL | Age: 39
End: 2024-12-30
Attending: OTOLARYNGOLOGY
Payer: MEDICAID

## 2024-12-30 DIAGNOSIS — J31.0 CHRONIC RHINITIS: Primary | ICD-10-CM

## 2024-12-30 LAB — IGE SERPL-ACNC: 80 IU/ML (ref 0–100)

## 2024-12-30 PROCEDURE — 36415 COLL VENOUS BLD VENIPUNCTURE: CPT | Performed by: OTOLARYNGOLOGY

## 2024-12-30 PROCEDURE — 86003 ALLG SPEC IGE CRUDE XTRC EA: CPT | Performed by: OTOLARYNGOLOGY

## 2024-12-30 PROCEDURE — 86003 ALLG SPEC IGE CRUDE XTRC EA: CPT | Mod: 59 | Performed by: OTOLARYNGOLOGY

## 2024-12-30 PROCEDURE — 82785 ASSAY OF IGE: CPT | Performed by: OTOLARYNGOLOGY

## 2025-01-06 LAB
A ALTERNATA IGE QN: <0.1 KU/L
A FUMIGATUS IGE QN: <0.1 KU/L
ALLERGEN BOXELDER MAPLE TREE IGE: <0.1 KU/L
ALLERGEN MULBERRY TREE IGE: <0.1 KU/L
ALLERGEN WALNUT TREE IGE: <0.1 KU/L
BERMUDA GRASS IGE QN: <0.1 KU/L
C HERBARUM IGE QN: <0.1 KU/L
CAT DANDER IGE QN: <0.1 KU/L
CEDAR IGE QN: <0.1 KU/L
CLAM IGE QN: <0.1 KU/L
CODFISH IGE QN: <0.1 KU/L
COMMON RAGWEED IGE QN: <0.1 KU/L
CORN IGE QN: <0.1 KU/L
COW MILK IGE QN: <0.1 KU/L
D FARINAE IGE QN: <0.1 KU/L
D PTERONYSS IGE QN: <0.1 KU/L
DEPRECATED CEDAR IGE RAST QL: NORMAL
DEPRECATED TIMOTHY IGE RAST QL: NORMAL
DOG DANDER IGE QN: <0.1 KU/L
EGG WHITE IGE QN: <0.1 KU/L
ELDER IGE QN: <0.1 KU/L
ELM CEDAR, IGE: <0.1 KU/L
P NOTATUM IGE QN: <0.1 KU/L
PEANUT IGE QN: <0.1 KU/L
PECAN/HICK TREE IGE QN: <0.1 KU/L
RAST CLASS: NORMAL
SCALLOP IGE QN: <0.1 KU/L
SESAME SEED IGE QN: <0.1 KU/L
SHRIMP IGE QN: <0.1 KU/L
SOYBEAN IGE QN: <0.1 KU/L
TIMOTHY IGE QN: <0.1 KU/L
WALNUT IGE QN: <0.1 KU/L
WHEAT IGE QN: <0.1 KU/L
WHITE OAK IGE QN: <0.1 KU/L

## 2025-01-07 LAB
ALLERGEN NAME: NORMAL
ALLERGEN RESULT: NORMAL

## 2025-01-16 ENCOUNTER — HOSPITAL ENCOUNTER (EMERGENCY)
Facility: HOSPITAL | Age: 40
Discharge: HOME | End: 2025-01-16
Attending: EMERGENCY MEDICINE
Payer: MEDICAID

## 2025-01-16 VITALS
SYSTOLIC BLOOD PRESSURE: 152 MMHG | DIASTOLIC BLOOD PRESSURE: 90 MMHG | HEART RATE: 85 BPM | RESPIRATION RATE: 16 BRPM | WEIGHT: 196 LBS | BODY MASS INDEX: 33.46 KG/M2 | TEMPERATURE: 98 F | HEIGHT: 64 IN | OXYGEN SATURATION: 100 %

## 2025-01-16 DIAGNOSIS — R03.0 ELEVATED BLOOD PRESSURE READING: Primary | ICD-10-CM

## 2025-01-16 DIAGNOSIS — E87.6 HYPOKALEMIA: ICD-10-CM

## 2025-01-16 DIAGNOSIS — M79.10 MYALGIA: ICD-10-CM

## 2025-01-16 DIAGNOSIS — R51.9 NONINTRACTABLE HEADACHE, UNSPECIFIED CHRONICITY PATTERN, UNSPECIFIED HEADACHE TYPE: ICD-10-CM

## 2025-01-16 LAB
ALBUMIN SERPL BCP-MCNC: 3.8 G/DL (ref 3.5–5.2)
ALP SERPL-CCNC: 88 U/L (ref 55–135)
ALT SERPL W/O P-5'-P-CCNC: 18 U/L (ref 10–44)
ANION GAP SERPL CALC-SCNC: 11 MMOL/L (ref 8–16)
AST SERPL-CCNC: 15 U/L (ref 10–40)
B-HCG UR QL: NEGATIVE
BASOPHILS # BLD AUTO: 0.02 K/UL (ref 0–0.2)
BASOPHILS NFR BLD: 0.3 % (ref 0–1.9)
BILIRUB SERPL-MCNC: 0.7 MG/DL (ref 0.1–1)
BUN SERPL-MCNC: 9 MG/DL (ref 6–20)
CALCIUM SERPL-MCNC: 9.4 MG/DL (ref 8.7–10.5)
CHLORIDE SERPL-SCNC: 103 MMOL/L (ref 95–110)
CO2 SERPL-SCNC: 27 MMOL/L (ref 23–29)
CREAT SERPL-MCNC: 0.8 MG/DL (ref 0.5–1.4)
DIFFERENTIAL METHOD BLD: ABNORMAL
EOSINOPHIL # BLD AUTO: 0.2 K/UL (ref 0–0.5)
EOSINOPHIL NFR BLD: 2.3 % (ref 0–8)
ERYTHROCYTE [DISTWIDTH] IN BLOOD BY AUTOMATED COUNT: 13 % (ref 11.5–14.5)
EST. GFR  (NO RACE VARIABLE): >60 ML/MIN/1.73 M^2
GLUCOSE SERPL-MCNC: 88 MG/DL (ref 70–110)
HCT VFR BLD AUTO: 41.2 % (ref 37–48.5)
HGB BLD-MCNC: 13 G/DL (ref 12–16)
IMM GRANULOCYTES # BLD AUTO: 0.03 K/UL (ref 0–0.04)
IMM GRANULOCYTES NFR BLD AUTO: 0.5 % (ref 0–0.5)
LYMPHOCYTES # BLD AUTO: 1.5 K/UL (ref 1–4.8)
LYMPHOCYTES NFR BLD: 22.7 % (ref 18–48)
MAGNESIUM SERPL-MCNC: 1.9 MG/DL (ref 1.6–2.6)
MCH RBC QN AUTO: 29.3 PG (ref 27–31)
MCHC RBC AUTO-ENTMCNC: 31.6 G/DL (ref 32–36)
MCV RBC AUTO: 93 FL (ref 82–98)
MONOCYTES # BLD AUTO: 0.6 K/UL (ref 0.3–1)
MONOCYTES NFR BLD: 9.2 % (ref 4–15)
NEUTROPHILS # BLD AUTO: 4.3 K/UL (ref 1.8–7.7)
NEUTROPHILS NFR BLD: 65 % (ref 38–73)
NRBC BLD-RTO: 0 /100 WBC
NT-PROBNP SERPL-MCNC: 14 PG/ML (ref 5–450)
PLATELET # BLD AUTO: 304 K/UL (ref 150–450)
PMV BLD AUTO: 10.3 FL (ref 9.2–12.9)
POTASSIUM SERPL-SCNC: 3.4 MMOL/L (ref 3.5–5.1)
PROT SERPL-MCNC: 7.4 G/DL (ref 6–8.4)
RBC # BLD AUTO: 4.43 M/UL (ref 4–5.4)
SODIUM SERPL-SCNC: 141 MMOL/L (ref 136–145)
TSH SERPL DL<=0.005 MIU/L-ACNC: 2.79 UIU/ML (ref 0.4–4)
WBC # BLD AUTO: 6.61 K/UL (ref 3.9–12.7)

## 2025-01-16 PROCEDURE — 80053 COMPREHEN METABOLIC PANEL: CPT | Performed by: NURSE PRACTITIONER

## 2025-01-16 PROCEDURE — 83735 ASSAY OF MAGNESIUM: CPT | Performed by: NURSE PRACTITIONER

## 2025-01-16 PROCEDURE — 83880 ASSAY OF NATRIURETIC PEPTIDE: CPT | Performed by: NURSE PRACTITIONER

## 2025-01-16 PROCEDURE — 36415 COLL VENOUS BLD VENIPUNCTURE: CPT | Performed by: NURSE PRACTITIONER

## 2025-01-16 PROCEDURE — 85025 COMPLETE CBC W/AUTO DIFF WBC: CPT | Performed by: NURSE PRACTITIONER

## 2025-01-16 PROCEDURE — 25000003 PHARM REV CODE 250: Performed by: NURSE PRACTITIONER

## 2025-01-16 PROCEDURE — 84443 ASSAY THYROID STIM HORMONE: CPT | Performed by: NURSE PRACTITIONER

## 2025-01-16 PROCEDURE — 81025 URINE PREGNANCY TEST: CPT | Performed by: NURSE PRACTITIONER

## 2025-01-16 PROCEDURE — 99283 EMERGENCY DEPT VISIT LOW MDM: CPT

## 2025-01-16 RX ADMIN — POTASSIUM BICARBONATE 20 MEQ: 391 TABLET, EFFERVESCENT ORAL at 06:01

## 2025-01-16 NOTE — ED PROVIDER NOTES
Encounter Date: 1/16/2025       History     Chief Complaint   Patient presents with    Headache     Pt stated that she has been experiencing fluid retention suspected to be from steroid use - pt placed on lasix by PCP since Monday - presents to ED with complaints of fluid retention / headaches / body aches.      This is a 39-year-old black female with a history of hypertension who presents to the emergency department with multiple complaints.  Patient reports that over the last 4-5 days she has been experiencing generalized muscle achiness and joint pain which she is attributing to fluid retention.  Patient is also endorsing generalized headaches and fatigue.  She was evaluated by her PCP on Monday and it was discussed that symptoms may be potentially due to recent 2 week tapered steroid use for chronic sinusitis.  She was placed on 20 mg Lasix daily and reports no relief with worsening symptoms.  She denies URI signs and symptoms, appetite change, nausea, vomiting, chest pain, shortness of breath, cough, abdominal pain dysuria, or diarrhea/constipation.  She denies NSAID use but later relates that she has been taking ibuprofen for headaches. Patient is hypertensive upon arrival to ED, noncompliant with prescribed nifedipine 2 years due to headaches.       Review of patient's allergies indicates:  No Known Allergies  Past Medical History:   Diagnosis Date    Essential (primary) hypertension     GERD (gastroesophageal reflux disease)     Insomnia      Past Surgical History:   Procedure Laterality Date    CHOLECYSTECTOMY      COLONOSCOPY N/A 10/18/2021    Procedure: COLONOSCOPY;  Surgeon: Jorge Yeung MD;  Location: [facility];  Service: Endoscopy;  Laterality: N/A;  needs rapid covid    TUBAL LIGATION       Family History   Problem Relation Name Age of Onset    Hypertension Mother      Hypertension Father       Social History     Tobacco Use    Smoking status: Never     Passive exposure: Never    Smokeless  tobacco: Never   Substance Use Topics    Alcohol use: Yes     Comment: occ    Drug use: Never     Review of Systems   Constitutional:  Positive for fatigue. Negative for appetite change and fever.   HENT:  Negative for congestion.    Respiratory:  Negative for cough.    Cardiovascular:  Positive for leg swelling. Negative for chest pain and palpitations.   Gastrointestinal:  Negative for abdominal pain, constipation, diarrhea, nausea and vomiting.   Genitourinary:  Negative for dysuria.   Musculoskeletal:  Positive for arthralgias and myalgias.   Neurological:  Positive for dizziness and headaches.       Physical Exam     Initial Vitals [01/16/25 1651]   BP Pulse Resp Temp SpO2   (!) 189/101 93 14 98.2 °F (36.8 °C) 100 %      MAP       --         Physical Exam    Nursing note and vitals reviewed.  Constitutional: She appears well-developed and well-nourished. She is active. No distress.   HENT:   Head: Normocephalic and atraumatic. Mouth/Throat: Oropharynx is clear and moist.   Eyes: EOM are normal. Pupils are equal, round, and reactive to light.   Neck: Neck supple.   Normal range of motion.  Cardiovascular:  Normal rate, regular rhythm and normal heart sounds.           Pulmonary/Chest: Breath sounds normal. No respiratory distress.   Musculoskeletal:         General: No edema (no discernible swelling). Normal range of motion.      Cervical back: Normal range of motion and neck supple.     Neurological: She is alert and oriented to person, place, and time. GCS score is 15. GCS eye subscore is 4. GCS verbal subscore is 5. GCS motor subscore is 6.   Skin: Skin is warm and dry. Capillary refill takes less than 2 seconds.   Psychiatric: Her behavior is normal. Thought content normal.         ED Course   Procedures  Labs Reviewed   CBC W/ AUTO DIFFERENTIAL - Abnormal       Result Value    WBC 6.61      RBC 4.43      Hemoglobin 13.0      Hematocrit 41.2      MCV 93      MCH 29.3      MCHC 31.6 (*)     RDW 13.0       Platelets 304      MPV 10.3      Immature Granulocytes 0.5      Gran # (ANC) 4.3      Immature Grans (Abs) 0.03      Lymph # 1.5      Mono # 0.6      Eos # 0.2      Baso # 0.02      nRBC 0      Gran % 65.0      Lymph % 22.7      Mono % 9.2      Eosinophil % 2.3      Basophil % 0.3      Differential Method Automated     COMPREHENSIVE METABOLIC PANEL - Abnormal    Sodium 141      Potassium 3.4 (*)     Chloride 103      CO2 27      Glucose 88      BUN 9      Creatinine 0.8      Calcium 9.4      Total Protein 7.4      Albumin 3.8      Total Bilirubin 0.7      Alkaline Phosphatase 88      AST 15      ALT 18      eGFR >60.0      Anion Gap 11     NT-PRO NATRIURETIC PEPTIDE    NT-proBNP 14     MAGNESIUM    Magnesium 1.9     TSH    TSH 2.789     PREGNANCY TEST, URINE RAPID    Preg Test, Ur Negative      Narrative:     Specimen Source->Urine          Imaging Results    None          Medications   potassium bicarbonate disintegrating tablet 20 mEq (has no administration in time range)     Medical Decision Making  Amount and/or Complexity of Data Reviewed  Labs: ordered.    Risk  Prescription drug management.               ED Course as of 01/16/25 1816   Thu Jan 16, 2025   1813 Labs relatively unremarkable except for finding of mild hypokalemia 3.4 likely due to furosemide, replaced here.  May be underlying reason for muscle pain.  All other labs unremarkable, BNP 14, magnesium 1.9, normal TSH, no sign of infection or other electrolyte abnormality.  No dehydration.  Patient was noted to be hypertensive here in the ED which she reports is only since she started taking the furosemide.  All results discussed with patient and she was encouraged to discontinue Lasix given lack of finding of swelling on exam.  She was encouraged to follow-up with her PCP for further evaluation of symptoms. [CB]      ED Course User Index  [CB] Maria Elena Penny NP                           Clinical Impression:  Final diagnoses:  [R03.0] Elevated  blood pressure reading (Primary)  [E87.6] Hypokalemia  [R51.9] Nonintractable headache, unspecified chronicity pattern, unspecified headache type  [M79.10] Myalgia          ED Disposition Condition    Discharge Stable          ED Prescriptions    None       Follow-up Information       Follow up With Specialties Details Why Contact Info    PCP Follow UP  Schedule an appointment as soon as possible for a visit in 2 days for follow-up, for re-evaluation of today's complaint              Maria Elena Penny NP  01/16/25 3591

## 2025-01-16 NOTE — Clinical Note
"Selena"Selena" Minor was seen and treated in our emergency department on 1/16/2025.  She may return to work on 01/17/2025.       If you have any questions or concerns, please don't hesitate to call.      Maria Elena Penny NP"

## 2025-01-17 NOTE — DISCHARGE INSTRUCTIONS
It is recommended that you discontinue use of Lasix.  Notify your primary doctor of your symptoms and schedule an appointment within the next 1-2 days.  You should have your blood pressure rechecked in the next 1-2 days as well and talk to your primary provider about recent elevations.  Return to the emergency department for worsening condition.

## 2025-07-10 ENCOUNTER — HOSPITAL ENCOUNTER (OUTPATIENT)
Dept: RADIOLOGY | Facility: HOSPITAL | Age: 40
Discharge: HOME | End: 2025-07-10
Attending: NURSE PRACTITIONER
Payer: COMMERCIAL

## 2025-07-10 DIAGNOSIS — M79.673 HEEL PAIN: ICD-10-CM

## 2025-07-10 DIAGNOSIS — M79.673 HEEL PAIN: Primary | ICD-10-CM

## 2025-07-10 PROCEDURE — 73630 X-RAY EXAM OF FOOT: CPT | Mod: TC,RT

## 2025-07-10 PROCEDURE — 73650 X-RAY EXAM OF HEEL: CPT | Mod: TC,RT
